# Patient Record
(demographics unavailable — no encounter records)

---

## 2024-11-18 NOTE — REASON FOR VISIT
[Follow-up] : a follow-up of an existing diagnosis [FreeTextEntry1] : chronic constipation, left sided abdominal pain

## 2024-11-18 NOTE — ASSESSMENT
[FreeTextEntry1] : Patient is a 63 year old male, with PMH of HTN, HLD, CAD s/p stenting ~5 years ago, who presents for follow up of chronic LUQ pain x years.  CT a/p in August 2024 showed diverticulosis of descending/ascending colon with ?stranding in the descending colon, cannot r/o diverticulitis however no explicit diverticulitis seen.  Pt had little relief with Benefiber and Miralax  Will treat with antibiotics at this time, CT A/P in 2 months, RTC in 3 months  Will request copy of previous colonoscopy again, might need repeat, depending on results of CT  Consider colorectal surgery evaluation next visit

## 2024-11-18 NOTE — HISTORY OF PRESENT ILLNESS
[FreeTextEntry1] : Patient is a 63 year old male, with PMH of HTN, HLD, CAD s/p stenting ~5 years ago, who presents for follow up of chronic left sided abdominal pain x years.   Pt was last seen in the office on 9/9/24 with c/o left sided abdominal pain, worse with laying on his side and has awoken him from sleep. Pt has constipation as well, uses Linzess once a month due to severe diarrhea side effects he gets. He states he uses it when he "needs to empty out".   Previously, he was recommended to use Benefiber and Miralax daily but admits to doing it for a few days then stopping. CT a/p was ordered and completed on 8/14/24. CT showed diverticulosis in the descending and sigmoid colon, minimal stranding to descending colon, minimal diverticulitis cannot be excluded but no other evidence of diverticulitis or colitis. Pt states the pain is localized to the LUQ and in the past when having a diverticulitis episode, he had LLQ pain.   HE has had multiple episodes of diverticulitis in the past. Has never seen colorectal surgery.   Previously seen by Dr Bartlett, had EGD about 2-3 years ago, showed gastritis per pt. We do not have records to review. Colonoscopy in the past with DR. Bartlett, per pt, was last done ~3 years ago and was told to repeat in 5 years due to polyps. H/O diverticulitis, last episode was a few years ago. We have requested records on 2 occasions and have not yet received any records from Dr. Bartlett's group.   Patient denies any significant pulmonary conditions.   Patient denies pyrosis, dysphagia, rectal bleeding, nausea, vomiting, or unexplained weight loss.

## 2024-11-18 NOTE — PHYSICAL EXAM
[Alert] : alert [Normal Voice/Communication] : normal voice/communication [Healthy Appearing] : healthy appearing [No Acute Distress] : no acute distress [Obese (BMI >= 30)] : obese (BMI >= 30) [Sclera] : the sclera and conjunctiva were normal [Hearing Threshold Finger Rub Not [place]] : hearing was normal [Normal Lips/Gums] : the lips and gums were normal [Normal Appearance] : the appearance of the neck was normal [No Respiratory Distress] : no respiratory distress [No Acc Muscle Use] : no accessory muscle use [Respiration, Rhythm And Depth] : normal respiratory rhythm and effort [Auscultation Breath Sounds / Voice Sounds] : lungs were clear to auscultation bilaterally [Heart Rate And Rhythm] : heart rate was normal and rhythm regular [Normal S1, S2] : normal S1 and S2 [Bowel Sounds] : normal bowel sounds [No Masses] : no abdominal mass palpated [Abdomen Soft] : soft [] : no hepatosplenomegaly [LUQ] : in the left upper quadrant [LLQ] : in the left lower quadrant [Oriented To Time, Place, And Person] : oriented to person, place, and time [RUQ] : not in the in the right upper quadrant [RLQ] : not in the right lower quadrant [de-identified] : left mid abdomen tenderness to deep palpation

## 2025-02-19 NOTE — HISTORY OF PRESENT ILLNESS
[FreeTextEntry1] : ELSA NG is a 63 year old male with PMH of HTN, HLD, CAD w/ stent x1, presenting today for a follow up visit. Last seen by EMMY Milton in November and was treated for mild diverticulitis with Cipro/ Flagyl. Repeat CT scan in January showed resolved diverticulitis. He complains of chronic LUQ pain for several years. It occurs daily and is worse when he lays on that side. He reports heartburn and acid reflux a few times a week but does not take any PPI's or H2RA's regularly, only on occasion if symptoms are severe. He reports early satiety, decreased appetite, and a 10 lb. unintentional weight loss over the last 2 months. He has never had an EGD. No nausea, vomiting, dysphagia, odynophagia. Denies family history of stomach cancer.   Last colonoscopy was in 2017 with Dr. Bartlett and was normal other than diverticulosis. No personal or family history of colon cancer or polyps. He denies lower abdominal pain, bowel changes, rectal bleeding.

## 2025-02-19 NOTE — ASSESSMENT
[FreeTextEntry1] : 63 year old male with complaints of LUQ pain, heartburn, early satiety, weight loss, and loss of appetite presenting for a follow up visit. No prior EGD evaluation. He will be scheduled for an upper endoscopy to rule out Vásquez's esophagus, PUD, gastritis, and H pylori. I have discussed the indications (including but not limited to ruling out Vásquez's esophagus, AVM's, H. pylori, and malignancies), benefits, risks (including but not limited to reaction to the anesthesia, infection, bleeding, missed lesions, and perforation), and alternatives to an endoscopy. The patient understands all options and has agreed to endoscopy and is medically optimized for the planned procedure.   Declines PPI therapy at this time and wishes to wait until procedure Needs cardiac clearance Obtain labs from PCP for review  Recent diverticulitis has resolved post antibiotic therapy High fiber diet  Miralax PRN for constipation Adequate fluid intake encouraged Colonoscopy due in 2027

## 2025-02-19 NOTE — PHYSICAL EXAM
[Alert] : alert [Normal Voice/Communication] : normal voice/communication [Healthy Appearing] : healthy appearing [No Acute Distress] : no acute distress [Sclera] : the sclera and conjunctiva were normal [Hearing Threshold Finger Rub Not [place]] : hearing was normal [Normal Lips/Gums] : the lips and gums were normal [Oropharynx] : the oropharynx was normal [Normal Appearance] : the appearance of the neck was normal [No Neck Mass] : no neck mass was observed [No Respiratory Distress] : no respiratory distress [No Acc Muscle Use] : no accessory muscle use [Respiration, Rhythm And Depth] : normal respiratory rhythm and effort [Auscultation Breath Sounds / Voice Sounds] : lungs were clear to auscultation bilaterally [Heart Rate And Rhythm] : heart rate was normal and rhythm regular [Normal S1, S2] : normal S1 and S2 [Murmurs] : no murmurs [Bowel Sounds] : normal bowel sounds [Abdomen Tenderness] : non-tender [No Masses] : no abdominal mass palpated [Abdomen Soft] : soft [] : no hepatosplenomegaly [Oriented To Time, Place, And Person] : oriented to person, place, and time

## 2025-02-19 NOTE — REASON FOR VISIT
[Follow-up] : a follow-up of an existing diagnosis [FreeTextEntry1] : LUQ pain, early satiety, weight loss, decreased appetite